# Patient Record
Sex: MALE | Race: BLACK OR AFRICAN AMERICAN
[De-identification: names, ages, dates, MRNs, and addresses within clinical notes are randomized per-mention and may not be internally consistent; named-entity substitution may affect disease eponyms.]

---

## 2018-05-22 ENCOUNTER — HOSPITAL ENCOUNTER (EMERGENCY)
Dept: HOSPITAL 12 - ER | Age: 48
Discharge: HOME | End: 2018-05-22
Payer: COMMERCIAL

## 2018-05-22 VITALS — DIASTOLIC BLOOD PRESSURE: 90 MMHG | SYSTOLIC BLOOD PRESSURE: 150 MMHG

## 2018-05-22 VITALS — WEIGHT: 180 LBS | HEIGHT: 66 IN | BODY MASS INDEX: 28.93 KG/M2

## 2018-05-22 DIAGNOSIS — I10: ICD-10-CM

## 2018-05-22 DIAGNOSIS — Z88.0: ICD-10-CM

## 2018-05-22 DIAGNOSIS — R01.1: Primary | ICD-10-CM

## 2018-05-22 DIAGNOSIS — J45.909: ICD-10-CM

## 2018-05-22 DIAGNOSIS — R79.89: ICD-10-CM

## 2018-05-22 DIAGNOSIS — F10.10: ICD-10-CM

## 2018-05-22 LAB
ALP SERPL-CCNC: 144 U/L (ref 50–136)
ALT SERPL W/O P-5'-P-CCNC: 336 U/L (ref 16–63)
AST SERPL-CCNC: 371 U/L (ref 15–37)
BASOPHILS # BLD AUTO: 0.1 K/UL (ref 0–8)
BASOPHILS NFR BLD AUTO: 1.1 % (ref 0–2)
BILIRUB SERPL-MCNC: 0.7 MG/DL (ref 0.2–1)
BUN SERPL-MCNC: 11 MG/DL (ref 7–18)
CHLORIDE SERPL-SCNC: 107 MMOL/L (ref 98–107)
CK SERPL-CCNC: 87 U/L (ref 39–308)
CO2 SERPL-SCNC: 32 MMOL/L (ref 21–32)
CREAT SERPL-MCNC: 1.3 MG/DL (ref 0.6–1.3)
EOSINOPHIL # BLD AUTO: 0.1 K/UL (ref 0–0.7)
EOSINOPHIL NFR BLD AUTO: 1.4 % (ref 0–7)
GLUCOSE SERPL-MCNC: 120 MG/DL (ref 74–106)
HCT VFR BLD AUTO: 45.2 % (ref 36.7–47.1)
HGB BLD-MCNC: 15.7 G/DL (ref 12.5–16.3)
LYMPHOCYTES # BLD AUTO: 2.6 K/UL (ref 20–40)
LYMPHOCYTES NFR BLD AUTO: 40.4 % (ref 20.5–51.5)
MCH RBC QN AUTO: 31.3 UUG (ref 23.8–33.4)
MCHC RBC AUTO-ENTMCNC: 35 G/DL (ref 32.5–36.3)
MCV RBC AUTO: 90.2 FL (ref 73–96.2)
MONOCYTES # BLD AUTO: 0.5 K/UL (ref 2–10)
MONOCYTES NFR BLD AUTO: 7.1 % (ref 0–11)
NEUTROPHILS # BLD AUTO: 3.2 K/UL (ref 1.8–8.9)
NEUTROPHILS NFR BLD AUTO: 50 % (ref 38.5–71.5)
PLATELET # BLD AUTO: 219 K/UL (ref 152–348)
POTASSIUM SERPL-SCNC: 4.1 MMOL/L (ref 3.5–5.1)
RBC # BLD AUTO: 5.01 MIL/UL (ref 4.06–5.63)
WBC # BLD AUTO: 6.4 K/UL (ref 3.6–10.2)
WS STN SPEC: 7.7 G/DL (ref 6.4–8.2)

## 2018-05-22 PROCEDURE — 80053 COMPREHEN METABOLIC PANEL: CPT

## 2018-05-22 PROCEDURE — 71045 X-RAY EXAM CHEST 1 VIEW: CPT

## 2018-05-22 PROCEDURE — 85025 COMPLETE CBC W/AUTO DIFF WBC: CPT

## 2018-05-22 PROCEDURE — 99285 EMERGENCY DEPT VISIT HI MDM: CPT

## 2018-05-22 PROCEDURE — 36415 COLL VENOUS BLD VENIPUNCTURE: CPT

## 2018-05-22 PROCEDURE — A4663 DIALYSIS BLOOD PRESSURE CUFF: HCPCS

## 2018-05-22 PROCEDURE — 70450 CT HEAD/BRAIN W/O DYE: CPT

## 2018-05-22 PROCEDURE — 84484 ASSAY OF TROPONIN QUANT: CPT

## 2018-05-22 PROCEDURE — 93005 ELECTROCARDIOGRAM TRACING: CPT

## 2018-05-22 PROCEDURE — 82550 ASSAY OF CK (CPK): CPT

## 2018-05-22 PROCEDURE — 85610 PROTHROMBIN TIME: CPT

## 2018-05-22 NOTE — NUR
dPatient discharged to home in stable conditon.  Written and verbal after care 
instructions given. 

Patient verbalizes understanding of instructions.

## 2018-05-29 ENCOUNTER — HOSPITAL ENCOUNTER (EMERGENCY)
Dept: HOSPITAL 12 - ER | Age: 48
Discharge: HOME | End: 2018-05-29
Payer: COMMERCIAL

## 2018-05-29 VITALS — BODY MASS INDEX: 29.99 KG/M2 | HEIGHT: 65 IN | WEIGHT: 180 LBS

## 2018-05-29 DIAGNOSIS — Z88.0: ICD-10-CM

## 2018-05-29 DIAGNOSIS — I10: Primary | ICD-10-CM

## 2018-05-29 DIAGNOSIS — J45.909: ICD-10-CM

## 2018-05-29 DIAGNOSIS — Z79.899: ICD-10-CM

## 2018-05-29 DIAGNOSIS — F17.210: ICD-10-CM

## 2018-05-29 DIAGNOSIS — R51: ICD-10-CM

## 2018-05-29 PROCEDURE — A4663 DIALYSIS BLOOD PRESSURE CUFF: HCPCS

## 2018-05-29 NOTE — NUR
Patient discharged to home in stable conditon.  Written and verbal after care 
instructions given. 

Patient verbalizes understanding of instructions.PT WALKS IN STEADY GAIT.

## 2018-06-02 ENCOUNTER — HOSPITAL ENCOUNTER (EMERGENCY)
Dept: HOSPITAL 12 - ER | Age: 48
Discharge: HOME | End: 2018-06-02
Payer: COMMERCIAL

## 2018-06-02 VITALS — WEIGHT: 180 LBS | BODY MASS INDEX: 28.93 KG/M2 | HEIGHT: 66 IN

## 2018-06-02 DIAGNOSIS — F17.210: ICD-10-CM

## 2018-06-02 DIAGNOSIS — Z79.899: ICD-10-CM

## 2018-06-02 DIAGNOSIS — J45.909: ICD-10-CM

## 2018-06-02 DIAGNOSIS — I10: ICD-10-CM

## 2018-06-02 DIAGNOSIS — J32.9: Primary | ICD-10-CM

## 2018-06-02 DIAGNOSIS — Z88.0: ICD-10-CM

## 2018-06-02 PROCEDURE — A4663 DIALYSIS BLOOD PRESSURE CUFF: HCPCS

## 2018-06-20 ENCOUNTER — HOSPITAL ENCOUNTER (EMERGENCY)
Dept: HOSPITAL 12 - ER | Age: 48
Discharge: HOME | End: 2018-06-20
Payer: COMMERCIAL

## 2018-06-20 VITALS — WEIGHT: 180 LBS | HEIGHT: 66 IN | BODY MASS INDEX: 28.93 KG/M2

## 2018-06-20 DIAGNOSIS — Z88.0: ICD-10-CM

## 2018-06-20 DIAGNOSIS — Z79.899: ICD-10-CM

## 2018-06-20 DIAGNOSIS — I10: Primary | ICD-10-CM

## 2018-06-20 DIAGNOSIS — J45.909: ICD-10-CM

## 2018-06-20 DIAGNOSIS — F17.210: ICD-10-CM

## 2018-06-20 PROCEDURE — A4663 DIALYSIS BLOOD PRESSURE CUFF: HCPCS

## 2018-06-20 PROCEDURE — 99283 EMERGENCY DEPT VISIT LOW MDM: CPT

## 2018-10-10 ENCOUNTER — HOSPITAL ENCOUNTER (EMERGENCY)
Dept: HOSPITAL 12 - ER | Age: 48
Discharge: HOME | End: 2018-10-10
Payer: COMMERCIAL

## 2018-10-10 VITALS — HEIGHT: 68 IN | WEIGHT: 180 LBS | BODY MASS INDEX: 27.28 KG/M2

## 2018-10-10 DIAGNOSIS — Z76.0: ICD-10-CM

## 2018-10-10 DIAGNOSIS — I10: Primary | ICD-10-CM

## 2018-10-10 DIAGNOSIS — J45.909: ICD-10-CM

## 2018-10-10 DIAGNOSIS — Z88.0: ICD-10-CM

## 2018-10-10 DIAGNOSIS — F17.200: ICD-10-CM

## 2018-10-10 PROCEDURE — A4663 DIALYSIS BLOOD PRESSURE CUFF: HCPCS

## 2018-10-10 PROCEDURE — 99283 EMERGENCY DEPT VISIT LOW MDM: CPT

## 2019-01-31 ENCOUNTER — HOSPITAL ENCOUNTER (EMERGENCY)
Dept: HOSPITAL 12 - ER | Age: 49
Discharge: HOME | End: 2019-01-31
Payer: COMMERCIAL

## 2019-01-31 VITALS — HEIGHT: 66 IN | BODY MASS INDEX: 24.11 KG/M2 | WEIGHT: 150 LBS

## 2019-01-31 DIAGNOSIS — Z79.899: ICD-10-CM

## 2019-01-31 DIAGNOSIS — I10: Primary | ICD-10-CM

## 2019-01-31 DIAGNOSIS — Z88.0: ICD-10-CM

## 2019-01-31 DIAGNOSIS — F17.290: ICD-10-CM

## 2019-01-31 DIAGNOSIS — Z76.0: ICD-10-CM

## 2019-01-31 DIAGNOSIS — J45.909: ICD-10-CM

## 2019-01-31 PROCEDURE — A4663 DIALYSIS BLOOD PRESSURE CUFF: HCPCS

## 2019-04-16 ENCOUNTER — HOSPITAL ENCOUNTER (EMERGENCY)
Dept: HOSPITAL 12 - ER | Age: 49
Discharge: HOME | End: 2019-04-16
Payer: COMMERCIAL

## 2019-04-16 VITALS — HEIGHT: 66 IN | WEIGHT: 180 LBS | BODY MASS INDEX: 28.93 KG/M2

## 2019-04-16 VITALS — DIASTOLIC BLOOD PRESSURE: 88 MMHG | SYSTOLIC BLOOD PRESSURE: 135 MMHG

## 2019-04-16 DIAGNOSIS — J45.909: Primary | ICD-10-CM

## 2019-04-16 DIAGNOSIS — I10: ICD-10-CM

## 2019-04-16 DIAGNOSIS — Z88.0: ICD-10-CM

## 2019-04-16 DIAGNOSIS — Z79.899: ICD-10-CM

## 2019-04-16 DIAGNOSIS — F17.210: ICD-10-CM

## 2019-04-16 DIAGNOSIS — Z76.0: ICD-10-CM

## 2019-04-16 PROCEDURE — A4663 DIALYSIS BLOOD PRESSURE CUFF: HCPCS
